# Patient Record
Sex: FEMALE | Race: ASIAN | NOT HISPANIC OR LATINO | URBAN - METROPOLITAN AREA
[De-identification: names, ages, dates, MRNs, and addresses within clinical notes are randomized per-mention and may not be internally consistent; named-entity substitution may affect disease eponyms.]

---

## 2024-05-24 ENCOUNTER — EMERGENCY (EMERGENCY)
Facility: HOSPITAL | Age: 74
LOS: 0 days | Discharge: HOME | End: 2024-05-24
Attending: EMERGENCY MEDICINE
Payer: MEDICARE

## 2024-05-24 DIAGNOSIS — M25.561 PAIN IN RIGHT KNEE: ICD-10-CM

## 2024-05-24 DIAGNOSIS — Z87.39 PERSONAL HISTORY OF OTHER DISEASES OF THE MUSCULOSKELETAL SYSTEM AND CONNECTIVE TISSUE: ICD-10-CM
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
## 2024-05-24 PROCEDURE — 93970 EXTREMITY STUDY: CPT | Mod: 26

## 2024-05-24 PROCEDURE — 93970 EXTREMITY STUDY: CPT

## 2024-05-24 PROCEDURE — 99284 EMERGENCY DEPT VISIT MOD MDM: CPT

## 2024-05-24 PROCEDURE — 73562 X-RAY EXAM OF KNEE 3: CPT | Mod: 26,RT

## 2024-05-24 PROCEDURE — 99284 EMERGENCY DEPT VISIT MOD MDM: CPT | Mod: 25

## 2024-05-24 PROCEDURE — 73562 X-RAY EXAM OF KNEE 3: CPT | Mod: RT

## 2024-05-24 NOTE — ED PROVIDER NOTE - CARE PROVIDER_API CALL
Angel-Denzel Anderson  Orthopaedic Surgery  [address] [facility] [place] [address]  Phone: [phone]  Fax: [phone]  Follow Up Time: 1-3 Days

## 2024-05-24 NOTE — ED PROVIDER NOTE - PATIENT PORTAL LINK FT
You can access the FollowMyHealth Patient Portal offered by [facility] by registering at the following website: [email or web address]. By joining [employer]’s FollowMyHealth portal, you will also be able to view your health information using other applications (apps) compatible with our system.

## 2024-05-24 NOTE — ED PROVIDER NOTE - CLINICAL SUMMARY MEDICAL DECISION MAKING FREE TEXT BOX
Received from signout at 4pm from Dr. Mcfadden.  N/V intact.  All imaging negative for acute pathology.  Likely gastrocnemius strain.  NSAIDs and DC home.  Strict return instructions discussed.

## 2024-05-24 NOTE — ED PROVIDER NOTE - OBJECTIVE STATEMENT
73 year old female with phmx of arthritis, left chronic knee pain comes in for right knee pain. pt was walking 4 days ago and felt a "pop". Pt normally ambulates by herself but now using a cane due to pain. has not taken anything for the pain. no falls/trauma. no other complaints.

## 2024-05-24 NOTE — ED PROVIDER NOTE - PROGRESS NOTE DETAILS
pt thought to be eloped however was sitting in waiting room >2/3 hours. pt told to wait by assigned seat and will wait for duplex. pt does state that pain and symptoms improved after meds. pk: all results d/w pt & copies given, strict return precautions discussed, rec outpt f.u with ortho, pt will take tylenol and motrin for pain

## 2024-05-24 NOTE — ED PROVIDER NOTE - PHYSICAL EXAMINATION
CONSTITUTIONAL:  in no acute distress.   SKIN: warm, dry  HEAD: Normocephalic; atraumatic.  EYES: PERRL, EOMI, normal sclera and conjunctiva   ENT: No nasal discharge; airway clear.  NECK: Supple; non tender.  CARD:  Regular rate and rhythm.   RESP: NO inc WOB   ABD: soft ntnd  EXT: Normal ROM.  neurovascularly intact.   LYMPH: No acute cervical adenopathy.  NEURO: Alert, oriented, grossly unremarkable  PSYCH: Cooperative, appropriate.

## 2024-05-24 NOTE — ED PROVIDER NOTE - WR ORDER DATE AND TIME 1
24-May-2024 13:26 Communicate Risk of Fall with Harm to all staff/Reinforce activity limits and safety measures with patient and family/Tailored Fall Risk Interventions/Visual Cue: Yellow wristband and red socks/Bed in lowest position, wheels locked, appropriate side rails in place/Call bell, personal items and telephone in reach/Instruct patient to call for assistance before getting out of bed or chair/Non-slip footwear when patient is out of bed/[place] to call system/Physically safe environment - no spills, clutter or unnecessary equipment/Purposeful Proactive Rounding/Room/bathroom lighting operational, light cord in reach

## 2024-05-24 NOTE — DOWNTIME INTERRUPTION NOTE - WHICH MANUAL FORMS INITIATED?
Medications were administered as per order during downtime. Please see med orders in downtime folder.